# Patient Record
Sex: FEMALE | Race: WHITE | NOT HISPANIC OR LATINO
[De-identification: names, ages, dates, MRNs, and addresses within clinical notes are randomized per-mention and may not be internally consistent; named-entity substitution may affect disease eponyms.]

---

## 2021-08-10 ENCOUNTER — APPOINTMENT (OUTPATIENT)
Dept: VASCULAR SURGERY | Facility: CLINIC | Age: 76
End: 2021-08-10
Payer: MEDICARE

## 2021-08-10 PROCEDURE — 99213 OFFICE O/P EST LOW 20 MIN: CPT

## 2021-08-11 PROBLEM — Z00.00 ENCOUNTER FOR PREVENTIVE HEALTH EXAMINATION: Status: ACTIVE | Noted: 2021-08-11

## 2021-09-07 ENCOUNTER — APPOINTMENT (OUTPATIENT)
Dept: VASCULAR SURGERY | Facility: CLINIC | Age: 76
End: 2021-09-07
Payer: MEDICARE

## 2021-09-07 VITALS — DIASTOLIC BLOOD PRESSURE: 82 MMHG | SYSTOLIC BLOOD PRESSURE: 165 MMHG | HEART RATE: 79 BPM

## 2021-09-07 VITALS — DIASTOLIC BLOOD PRESSURE: 78 MMHG | SYSTOLIC BLOOD PRESSURE: 157 MMHG | HEART RATE: 70 BPM

## 2021-09-07 PROCEDURE — 36471 NJX SCLRSNT MLT INCMPTNT VN: CPT | Mod: LT

## 2021-09-07 PROCEDURE — 36475 ENDOVENOUS RF 1ST VEIN: CPT | Mod: LT

## 2021-09-07 NOTE — PROCEDURE
[FreeTextEntry1] : L GSV RFA  and sclero [FreeTextEntry2] : Symptomatic venous reflux L GSV and spider veins  [FreeTextEntry3] : The patient was seen in the waiting room where the consent was obtained. She was then taken to the procedure room where a timeout was called to verify her identity and the laterality of the procedure. The patient's LEFT leg was then interrogated under ultrasound and an appropriate place for cannulation was identified. His right leg was then prepped and draped in the standard fashion with chloroprep. Under ultrasound guidance the patient was given an injection of 1% plain lidocaine in the proximal calf. Access was then obtained with a 7 FR sheath in the standard fashion using a micropuncture technique. RFA catheter was placed to the SFJ and withdrawn 3.0 cm from the junction. Patient was then given tumescence around the saphenous vein under ultrasound guidance. The ablation was  performed in the standard fashion Using a total of 8 cycles. catheter and sheath were withdrawn. Complete hemostasis was achieved with manual compression. Steri strips were applied to catheter insertion site. Leg was wrapped in Kerlix and an ace wrap. Patient tolerated the procedure well. She  verbalized understanding of post-procedure instructions. \par \par The patient underwent bilateral lower extremity sclerotherapy with 0.5% Polidocanol. Manual pressure was applied over injection points. The patient tolerated the procedure well. The patient was placed in compression stockings bilaterally. The patient was instructed to wear compression stockings continuously for the next 3 days and on a daily basis for 2 weeks. The patient will followup in 2-3 weeks sooner if they develop a problem. Patient tolerated the procedure well and verbalized understanding of the post procedure instructions.\par \par \par \par The patient was discharged in stable condition.\par

## 2021-09-14 ENCOUNTER — APPOINTMENT (OUTPATIENT)
Dept: VASCULAR SURGERY | Facility: CLINIC | Age: 76
End: 2021-09-14
Payer: MEDICARE

## 2021-09-14 DIAGNOSIS — I83.899 VARICOSE VEINS OF UNSPECIFIED LOWER EXTREMITY WITH OTHER COMPLICATIONS: ICD-10-CM

## 2021-09-14 DIAGNOSIS — R60.0 LOCALIZED EDEMA: ICD-10-CM

## 2021-09-14 PROCEDURE — 99214 OFFICE O/P EST MOD 30 MIN: CPT

## 2021-09-14 PROCEDURE — 93971 EXTREMITY STUDY: CPT

## 2021-09-15 PROBLEM — R60.0 BILATERAL LEG EDEMA: Status: ACTIVE | Noted: 2021-09-15

## 2021-09-15 PROBLEM — I83.899 SYMPTOMATIC VARICOSE VEINS, UNSPECIFIED LATERALITY: Status: ACTIVE | Noted: 2021-09-15

## 2021-09-15 NOTE — REASON FOR VISIT
[Follow-Up: _____] : a [unfilled] follow-up visit [FreeTextEntry1] : Status post left greater saphenous vein RFA.

## 2021-09-15 NOTE — ASSESSMENT
[FreeTextEntry1] : 74 yo F s/p RFA and sclero. Doing well. \par COntinue compression and elevaiton for another week then as needed. \par Follow up in 6 months or sooner.

## 2021-09-15 NOTE — HISTORY OF PRESENT ILLNESS
[FreeTextEntry1] : Patient is s/p RFA of L GSV. \par Doing well post-procedure. Swelling and VVs improved significantly. \par Pain and discomfort better. \par

## 2021-09-15 NOTE — PHYSICAL EXAM
[JVD] : no jugular venous distention  [Ankle Swelling (On Exam)] : present [Varicose Veins Of Lower Extremities] : not present [] : not present [No Rash or Lesion] : No rash or lesion [Calm] : calm [de-identified] : NAD. Awake and alert [de-identified] : NCAT [FreeTextEntry1] : Pulses palpable throughout [de-identified] : Soft, NT, ND. No guarding. No palpable masses. No HSM [de-identified] : no CVAT [de-identified] : Normal muscle bulk and tone. FROM in all extremities.\par  [de-identified] : Ecchymosis at the venous access site

## 2022-01-28 ENCOUNTER — RESULT REVIEW (OUTPATIENT)
Age: 77
End: 2022-01-28

## 2025-02-03 ENCOUNTER — APPOINTMENT (OUTPATIENT)
Dept: VASCULAR SURGERY | Facility: CLINIC | Age: 80
End: 2025-02-03
Payer: MEDICARE

## 2025-02-03 VITALS
BODY MASS INDEX: 26.5 KG/M2 | WEIGHT: 135 LBS | HEART RATE: 85 BPM | SYSTOLIC BLOOD PRESSURE: 140 MMHG | DIASTOLIC BLOOD PRESSURE: 84 MMHG | HEIGHT: 60 IN

## 2025-02-03 DIAGNOSIS — I10 ESSENTIAL (PRIMARY) HYPERTENSION: ICD-10-CM

## 2025-02-03 DIAGNOSIS — Z87.891 PERSONAL HISTORY OF NICOTINE DEPENDENCE: ICD-10-CM

## 2025-02-03 DIAGNOSIS — I83.93 ASYMPTOMATIC VARICOSE VEINS OF BILATERAL LOWER EXTREMITIES: ICD-10-CM

## 2025-02-03 DIAGNOSIS — F43.9 REACTION TO SEVERE STRESS, UNSPECIFIED: ICD-10-CM

## 2025-02-03 DIAGNOSIS — F41.9 ANXIETY DISORDER, UNSPECIFIED: ICD-10-CM

## 2025-02-03 PROCEDURE — 99203 OFFICE O/P NEW LOW 30 MIN: CPT

## 2025-02-03 RX ORDER — LOSARTAN POTASSIUM 100 MG/1
TABLET, FILM COATED ORAL
Refills: 0 | Status: ACTIVE | COMMUNITY

## 2025-02-03 RX ORDER — SERTRALINE HYDROCHLORIDE 25 MG/1
TABLET, FILM COATED ORAL
Refills: 0 | Status: ACTIVE | COMMUNITY

## 2025-02-17 ENCOUNTER — TRANSCRIPTION ENCOUNTER (OUTPATIENT)
Age: 80
End: 2025-02-17

## 2025-03-24 ENCOUNTER — APPOINTMENT (OUTPATIENT)
Dept: VASCULAR SURGERY | Facility: CLINIC | Age: 80
End: 2025-03-24
Payer: SELF-PAY

## 2025-03-24 VITALS — HEIGHT: 60 IN | BODY MASS INDEX: 26.5 KG/M2 | WEIGHT: 135 LBS

## 2025-03-24 DIAGNOSIS — I83.93 ASYMPTOMATIC VARICOSE VEINS OF BILATERAL LOWER EXTREMITIES: ICD-10-CM

## 2025-03-24 PROCEDURE — 36471 NJX SCLRSNT MLT INCMPTNT VN: CPT
